# Patient Record
Sex: MALE | Race: WHITE | NOT HISPANIC OR LATINO
[De-identification: names, ages, dates, MRNs, and addresses within clinical notes are randomized per-mention and may not be internally consistent; named-entity substitution may affect disease eponyms.]

---

## 2019-12-19 ENCOUNTER — APPOINTMENT (OUTPATIENT)
Dept: COLORECTAL SURGERY | Facility: CLINIC | Age: 44
End: 2019-12-19
Payer: COMMERCIAL

## 2019-12-19 VITALS
HEIGHT: 70 IN | SYSTOLIC BLOOD PRESSURE: 153 MMHG | BODY MASS INDEX: 29.35 KG/M2 | DIASTOLIC BLOOD PRESSURE: 98 MMHG | TEMPERATURE: 97.5 F | WEIGHT: 205 LBS | HEART RATE: 108 BPM

## 2019-12-19 DIAGNOSIS — I10 ESSENTIAL (PRIMARY) HYPERTENSION: ICD-10-CM

## 2019-12-19 DIAGNOSIS — M19.90 UNSPECIFIED OSTEOARTHRITIS, UNSPECIFIED SITE: ICD-10-CM

## 2019-12-19 DIAGNOSIS — Z78.9 OTHER SPECIFIED HEALTH STATUS: ICD-10-CM

## 2019-12-19 DIAGNOSIS — Z84.0 FAMILY HISTORY OF DISEASES OF THE SKIN AND SUBCUTANEOUS TISSUE: ICD-10-CM

## 2019-12-19 DIAGNOSIS — Z80.0 FAMILY HISTORY OF MALIGNANT NEOPLASM OF DIGESTIVE ORGANS: ICD-10-CM

## 2019-12-19 DIAGNOSIS — F17.200 NICOTINE DEPENDENCE, UNSPECIFIED, UNCOMPLICATED: ICD-10-CM

## 2019-12-19 DIAGNOSIS — Z82.61 FAMILY HISTORY OF ARTHRITIS: ICD-10-CM

## 2019-12-19 DIAGNOSIS — K63.9 DISEASE OF INTESTINE, UNSPECIFIED: ICD-10-CM

## 2019-12-19 PROBLEM — Z00.00 ENCOUNTER FOR PREVENTIVE HEALTH EXAMINATION: Status: ACTIVE | Noted: 2019-12-19

## 2019-12-19 PROCEDURE — 46221 LIGATION OF HEMORRHOID(S): CPT

## 2019-12-19 PROCEDURE — 99202 OFFICE O/P NEW SF 15 MIN: CPT | Mod: 25

## 2019-12-19 RX ORDER — ESCITALOPRAM OXALATE 20 MG/1
TABLET ORAL
Refills: 0 | Status: ACTIVE | COMMUNITY

## 2019-12-19 RX ORDER — HYDROCHLOROTHIAZIDE 100 MG
100 TABLET ORAL
Refills: 0 | Status: ACTIVE | COMMUNITY

## 2019-12-19 RX ORDER — LISINOPRIL 30 MG/1
TABLET ORAL
Refills: 0 | Status: ACTIVE | COMMUNITY

## 2019-12-20 NOTE — ASSESSMENT
[FreeTextEntry1] : Exam findings and diagnosis were discussed at length with patient. \par Recommendations including increased fiber intake, adequate daily hydration, stool softeners as needed, and sitz baths as needed and after bowel movements were discussed.\par Medical management, such as calmol-4 suppositories or hydrocortisone cream/suppositories were discussed.\par Office based treatment, such as rubber band ligation or sclerotherapy, was discussed as an alternative if symptoms persist despite conservative management.\par Role of hemorrhoidectomy also discussed.\par Office procedure and surgical risks and benefits discussed.\par Rubber banding treatments initiated today, f/u in 3 weeks for reevaluation or sooner as needed\par All questions answered, patient expressed understanding and is agreeable to this plan.

## 2019-12-20 NOTE — HISTORY OF PRESENT ILLNESS
[FreeTextEntry1] : 45 y/o M presents for evaluation of hemorrhoids, referred by Dr. Denney\par Reports BRBPR on the TP and in the bowl with some but not all BM's. Also c/o drainage of clear fluid recently that has worsened. Using TP\par Reports itching. Denies pain\par Personal h/o colitis, hospitalized once at [facility]. States he was only treated with pain medication. Also reports a personal history of an unnamed autoimmune disorder similar to psoriatic arthritis. He is managed by a rheumatologist at [place]. He has previously completed an 18 month course of Imuran, during which time his abdominal pain and bleeding improved\par H/o back pain for years during which time patient was using an NSAID daily. Recently he has switched to using Tylenol which he states has helped with symptoms\par BH:3-4 times daily, only able to expel small stools\par Report tenesmus, sitting on the toilet hourly and expelling blood\par Last colonoscopy completed 2019. Had two previous colonoscopies 2015 and 2017 for abdominal pain. IN addition had a pill cam study that was also negative. GI believes abdominal pain may be related to hemorrhoids\par Has tried OTC hemorrhoid medications and Recticare. States recticare is the only medication that has provided slight relief of symptoms. Has never been prescribed medications. \par Reports [facility] of colon cancer in his  paternal grandmother, diagnosed 65 years old \par ASA/NSAIDs last 7 days

## 2019-12-20 NOTE — PHYSICAL EXAM
[FreeTextEntry1] :  Medical assistant present for duration of physical examination\par \par Gen NAD, AOx3\par \par Anorectal Exam:\par Inspection no erythema, induration or fluctuance, no skin excoriation, no fissure, multiple soft external hemorrhoidal tags nonthrombosed\par VERONIQUE nontender, no masses palpated, no blood on gloved finger\par Anoscopy no fissure, large internal hemorrhoids\par \par

## 2019-12-20 NOTE — PROCEDURE
[FreeTextEntry1] : Rubber Band Ligation\par \par Pre-procedure Diagnosis: Symptomatic Internal Hemorrhoids\par Post-procedure Diagnosis: Symptomatic Internal Hemorrhoids\par Procedure: Anoscopy with Rubber Band Ligation\par Anesthesia: none\par Estimated blood loss: minimal\par Specimen: none\par Drain: none\par Complications: none\par \par Indications: Patient presents with history of symptoms related to internal hemorrhoids. On physical exam, large internal hemorrhoids were detected. Risks/benefits/alternative were discussed and patient agreed to proceed with office based procedure.\par \par Procedure Details: Consent obtained. Patient was placed in a modified prone kaylyn-knife position on the examination table. Digital rectal exam was performed with an index finger with surgical jelly lubricant. An anoscope was inserted into the anal canal, and a prominent hemorrhoidal bundle was identified in the left lateral position. Using the hemorrhoid ligation device, a rubber band was placed around this hemorrhoid. No active bleeding was noted. The anoscope was removed. The patient tolerated the procedure well.\par \par Post-procedure instructions were reviewed with the patient, including recommendation to notify office immediately for any symptoms of severe pain, bleeding, inability to urinate, fever, or any other concern. All questions answered.\par

## 2019-12-20 NOTE — CONSULT LETTER
[Dear  ___] : Dear  [unfilled], [Consult Letter:] : I had the pleasure of evaluating your patient, [unfilled]. [( Thank you for referring [unfilled] for consultation for _____ )] : Thank you for referring [unfilled] for consultation for [unfilled] [Please see my note below.] : Please see my note below. [Consult Closing:] : Thank you very much for allowing me to participate in the care of this patient.  If you have any questions, please do not hesitate to contact me. [Sincerely,] : Sincerely, [FreeTextEntry2] : AXEL ABDULLAHI\par [address]  \[place] [address]\par  [FreeTextEntry3] : JARRETT AGUILERA MD

## 2020-01-09 ENCOUNTER — APPOINTMENT (OUTPATIENT)
Dept: COLORECTAL SURGERY | Facility: CLINIC | Age: 45
End: 2020-01-09
Payer: COMMERCIAL

## 2020-01-09 VITALS
DIASTOLIC BLOOD PRESSURE: 87 MMHG | HEIGHT: 70 IN | HEART RATE: 88 BPM | WEIGHT: 208 LBS | SYSTOLIC BLOOD PRESSURE: 128 MMHG | BODY MASS INDEX: 29.78 KG/M2 | TEMPERATURE: 98.2 F

## 2020-01-09 DIAGNOSIS — K64.8 OTHER HEMORRHOIDS: ICD-10-CM

## 2020-01-09 PROCEDURE — 46221 LIGATION OF HEMORRHOID(S): CPT

## 2020-01-09 PROCEDURE — 99212 OFFICE O/P EST SF 10 MIN: CPT | Mod: 25

## 2020-01-10 NOTE — ASSESSMENT
[FreeTextEntry1] : Symptomatic improvement with first rubber band treatment\par Further banding performed today\par Post-procedure instructions were reviewed with the patient\par Continued medical management for colitis history per GI recommendations\par F/u as needed if symptoms return or worsen\par All questions were answered, patient expressed understanding, and is agreeable to this plan.\par \par

## 2020-01-10 NOTE — PHYSICAL EXAM
[FreeTextEntry1] :  Medical assistant present for duration of physical examination\par \par Gen NAD, AOx3\par \par Anorectal Exam:\par Inspection multiple soft external hemorrhoidal tags nonthrombosed\par VERONIQUE nontender, no masses palpated, no blood on gloved finger\par Anoscopy good response to prior band, large internal hemorrhoid persist RA and RP\par \par

## 2020-01-10 NOTE — HISTORY OF PRESENT ILLNESS
[FreeTextEntry1] : 45yo male presents in follow up for hemorrhoids\par s/p left lateral rubber band ligation 12/19/19\par Denies adverse reaction post procedure, reports only mild discomfort\par Had resolution of symptoms for 2 weeks post banding, returned to work after the holiday break and noticed recent return of BRBPR\par Denies diarrhea, constipation or change in bowel habits

## 2020-01-10 NOTE — CONSULT LETTER
[Dear  ___] : Dear  [unfilled], [Consult Letter:] : I had the pleasure of evaluating your patient, [unfilled]. [( Thank you for referring [unfilled] for consultation for _____ )] : Thank you for referring [unfilled] for consultation for [unfilled] [Please see my note below.] : Please see my note below. [Consult Closing:] : Thank you very much for allowing me to participate in the care of this patient.  If you have any questions, please do not hesitate to contact me. [Sincerely,] : Sincerely, [FreeTextEntry2] : AXEL ABDULLAHI\par [address] [place] [address], [address]  \[place]  [address]\par  [FreeTextEntry3] : JARRETT AGUILERA MD

## 2020-01-10 NOTE — PROCEDURE
[FreeTextEntry1] : Rubber Band Ligation\par \par Pre-procedure Diagnosis: Symptomatic Internal Hemorrhoids\par Post-procedure Diagnosis: Symptomatic Internal Hemorrhoids\par Procedure: Anoscopy with Rubber Band Ligation\par Anesthesia: none\par Estimated blood loss: minimal\par Specimen: none\par Drain: none\par Complications: none\par \par Procedure Details: Consent obtained. Patient was placed in a modified prone kaylyn-knife position on the examination table. Digital rectal exam was performed with an index finger with surgical jelly lubricant. An anoscope was inserted into the anal canal, and a prominent hemorrhoidal bundle was identified in the right anterior and posterior position. Using the hemorrhoid ligation device, a rubber band was placed around this hemorrhoid. No active bleeding was noted. The anoscope was removed. The patient tolerated the procedure well.\par \par